# Patient Record
Sex: FEMALE | Race: WHITE | URBAN - METROPOLITAN AREA
[De-identification: names, ages, dates, MRNs, and addresses within clinical notes are randomized per-mention and may not be internally consistent; named-entity substitution may affect disease eponyms.]

---

## 2020-11-24 ENCOUNTER — HOSPITAL ENCOUNTER (EMERGENCY)
Age: 30
Discharge: HOME | End: 2020-11-24
Attending: EMERGENCY MEDICINE
Payer: MEDICAID

## 2020-11-24 VITALS
RESPIRATION RATE: 11 BRPM | HEART RATE: 90 BPM | OXYGEN SATURATION: 100 % | TEMPERATURE: 98 F | SYSTOLIC BLOOD PRESSURE: 116 MMHG | DIASTOLIC BLOOD PRESSURE: 79 MMHG

## 2020-11-24 DIAGNOSIS — T40.601A OPIATE OVERDOSE, ACCIDENTAL OR UNINTENTIONAL, INITIAL ENCOUNTER (HCC): Primary | ICD-10-CM

## 2020-11-24 PROCEDURE — 99285 EMERGENCY DEPT VISIT HI MDM: CPT

## 2020-11-25 ENCOUNTER — HOSPITAL ENCOUNTER (OUTPATIENT)
Age: 30
Setting detail: SPECIMEN
Discharge: HOME | End: 2020-11-25
Payer: MEDICAID

## 2020-11-25 PROCEDURE — U0002 COVID-19 LAB TEST NON-CDC: HCPCS

## 2020-11-25 NOTE — ED PROVIDER NOTES
EMERGENCY DEPARTMENT ENCOUNTER    Patient: Yohan Munson  MRN: [id number]  : 1990  Date of Evaluation: 2020  ED Provider:  Hamida Abebe    CHIEF COMPLAINT  Chief Complaint   Patient presents with    Drug Overdose       HPI  Yohan Munson is a 31 y.o. female who presents after being found unresponsive in the passenger seat of a parked car. The person the  seat was also reported to be unresponsive. Medics arrived and the patient and the other person in the vehicle were able to be aroused without Narcan. They did admit to using heroin. Patient does continue to admit to using heroin here in the emergency department. Denies alcohol or any other illicit drug use. She denies any medical symptoms or complaints. Denies any other associated symptoms or complaints or concerns. REVIEW OF SYSTEMS    Constitutional: negative for fever, chills  Neurological: negative for HA, focal weakness, loss of sensation  Ophthalmic: negative for vision change  ENT: negative for congestion, rhinorrhea  Cardiovascular: negative for chest pain  Respiratory: negative for SOB, cough  GI: negative for abdominal pain, nausea, vomiting, diarrhea, constipation  : negative for dysuria, hematuria  Musculoskeletal: negative for myalgias, decreased ROM, joint swelling  Dermatological: negative for rash, wounds  Heme: Negative for bleeding, bruising      PAST MEDICAL HISTORY  Past Medical History:   Diagnosis Date    Addiction to drug ([facility] [address]     heroin    DDD (degenerative disc disease)     DJD (degenerative joint disease) of cervical spine     Hearing loss     Hepatitis C     Psoriasis        CURRENT MEDICATIONS  [unfilled]    ALLERGIES  No Known Allergies    SURGICAL HISTORY  Past Surgical History:   Procedure Laterality Date    TYMPANOSTOMY TUBE PLACEMENT      multiple placements 3 y.o. to 14 y.o    WISDOM TOOTH EXTRACTION         FAMILY HISTORY  No family history on file.     SOCIAL HISTORY  Social History     Socioeconomic History    Marital status: Single     Spouse name: Not on file    Number of children: Not on file    Years of education: Not on file    Highest education level: Not on file   Occupational History    Not on file   Social Needs    Financial resource strain: Not on file    Food insecurity     Worry: Not on file     Inability: Not on file    Transportation needs     Medical: Not on file     Non-medical: Not on file   Tobacco Use    Smoking status: Current Every Day Smoker     Packs/day: 0.50     Years: 1.00     Pack years: 0.50     Types: Cigarettes   Substance and Sexual Activity    Alcohol use: Yes     Comment: occasionally    Drug use: Yes     Types: Marijuana, Opiates      Comment: HEROIN LAST USED 08/14/16 AM    Sexual activity: Yes     Partners: Male   Lifestyle    Physical activity     Days per week: Not on file     Minutes per session: Not on file    Stress: Not on file   Relationships    Social connections     Talks on phone: Not on file     Gets together: Not on file     Attends Rastafari service: Not on file     Active member of club or organization: Not on file     Attends meetings of clubs or organizations: Not on file     Relationship status: Not on file    Intimate partner violence     Fear of current or ex partner: Not on file     Emotionally abused: Not on file     Physically abused: Not on file     Forced sexual activity: Not on file   Other Topics Concern    Not on file   Social History Narrative    Not on file         **Past medical, family and social histories, and nursing notes reviewed and verified by me**      PHYSICAL EXAM  VITAL SIGNS:   ED Triage Vitals [11/24/20 2030]   Enc Vitals Group      BP (!) 122/96      Pulse 86      Resp 16      Temp 98 °F (36.7 °C)      Temp src       SpO2 100 %      Weight       Height       Head Circumference       Peak Flow       Pain Score       Pain Loc       Pain Edu? Excl. in [address]?       Vitals during ED course were reviewed and are as charted. Constitutional: Minimal distress, Non-toxic appearance  Eyes: Conjunctiva normal, No discharge  HENT: Normocephalic, Atraumatic,  oropharynx moist  Neck: Supple, no stridor, no grossly visible or palpable masses  Cardiovascular: Regular rate and rhythm, No murmurs, No rubs, No gallops  Pulmonary/Chest: Normal breath sounds, No respiratory distress or accessory muscle use, No wheezing, crackles or rhonchi. Abdomen: Mild diffuse abdominal tenderness palpation without peritoneal signs, otherwise abdomen is soft, nondistended and nonrigid,  No masses, normal bowel sounds  Back: No midline point tenderness, No paraspinous muscle tenderness. No CVA tenderness  Extremities: No gross deformities, no edema, no tenderness  Neurologic: Normal motor function, Normal sensory function, No focal deficits  Skin: Warm, Dry, No erythema, No rash, No cyanosis, No mottling  Lymphatic: No lymphadenopathy in the following location(s): cervical  Psychiatric: Alert and oriented x3, Affect normal            RADIOLOGY/PROCEDURES/LABS/MEDICATIONS ADMINISTERED:    I have reviewed and interpreted all of the currently available lab results from this visit (if applicable):  No results found for this visit on 11/24/20. ABNORMAL LABS:  Labs Reviewed - No data to display      IMAGING STUDIES ORDERED:  None    No orders to display         MEDICATIONS ADMINISTERED:  Medications - No data to display      [address] [facility]  Last vitals: BP (!) 122/96   Pulse 85   Temp 98 °F (36.7 °C)   Resp 15   [id number]     58-year-old female with opiate overdose. Did not require Narcan. Observed for couple hours here in the emergency department and has remained stable throughout her ED course. There is no clinical evidence to suggest medical instability. Additional workup and treatment in the ED as documented above. Patient reassured and will be discharged home.  I have explained to the patient in appropriate terminology our work-up in the ED and their diagnosis. I have also given anticipatory guidance and expectant management of their condition as an outpatient as per my custom. The patient was given clear discharge and follow-up instructions including return to the ER immediately for worsening concerns. The patient has been advised to follow-up with their primary care physician and/or referred physician in the next two to three days or sooner if worsening and to return to the ER immediately as above with any concerns. I provided the patient counseling with regard to my customary list of strict return precautions as well as return precautions specific to the cause for today's emergency department visit. The patient will return under these provided conditions, but should also return for new concerns or further worsening. Pt and/or family understand and agree with plan. Clinical Impression:  1. Opiate overdose, accidental or unintentional, initial encounter [facility])        Disposition referral (if applicable): Your Primary care provider    Schedule an appointment as soon as possible for a visit       [address] [facility] Emergency Department  [facility] [phone]  [phone]    If symptoms worsen      Disposition medications (if applicable):  New Prescriptions    No medications on file       ED Provider Disposition Time  DISPOSITION Decision To Discharge 11/24/2020 10:09:36 PM          Electronically signed by: Maite Fonseca M.D., 11/24/2020 10:09 PM      This dictation was created with voice recognition software. While attempts have been made to review the dictation as it is transcribed, on occasion the spoken word can be misinterpreted by the technology leading to omissions or inappropriate words, phrases or sentences.       Samantha Casarez MD  11/25/20 [id number]

## 2020-11-25 NOTE — ED TRIAGE NOTES
Pt presents to ED for overdose. Pt was found in a parked car with her face in a plate of food. Pt did not require narcan at the scene. Pt is alert and oriented, but drowsy.  [facility] police is here for pt due to outstanding warrant

## 2020-11-27 LAB
SARS-COV-2: NOT DETECTED
SOURCE: NORMAL